# Patient Record
Sex: MALE | URBAN - NONMETROPOLITAN AREA
[De-identification: names, ages, dates, MRNs, and addresses within clinical notes are randomized per-mention and may not be internally consistent; named-entity substitution may affect disease eponyms.]

---

## 2018-12-27 NOTE — PATIENT DISCUSSION
Caused by Botox injection.  Advised heavy lubrication Gel QID. F/U one month. Some blur due to this as well.

## 2018-12-27 NOTE — PATIENT DISCUSSION
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 Heavy lubrication with gel QID. No or limited CL wear. Sleep with mask on. F/U one month.  Some of blur OD may be related to this.  Had symptoms prior to Botox of blur, but with signs today it is likely worse than typically seen.  F/U to compare and also see how the ERM affects [facility]. See below.

## 2019-11-01 ENCOUNTER — IMPORTED ENCOUNTER (OUTPATIENT)
Dept: URBAN - NONMETROPOLITAN AREA CLINIC 1 | Facility: CLINIC | Age: 59
End: 2019-11-01

## 2019-11-01 PROBLEM — H52.03: Noted: 2019-11-01

## 2019-11-01 PROBLEM — H52.4: Noted: 2019-11-01

## 2019-11-01 PROCEDURE — 92015 DETERMINE REFRACTIVE STATE: CPT

## 2019-11-01 PROCEDURE — 92004 COMPRE OPH EXAM NEW PT 1/>: CPT

## 2019-11-01 NOTE — PATIENT DISCUSSION
Simnple Hyperopia OU w/Presbyopia-  discussed findings w/patient-  s/p LASIK doing well-  new spectacle Rx issued-  monitor yearly or prn; 's Notes: MR 11/1/2019DFE 11/1/2019

## 2020-11-06 ENCOUNTER — IMPORTED ENCOUNTER (OUTPATIENT)
Dept: URBAN - NONMETROPOLITAN AREA CLINIC 1 | Facility: CLINIC | Age: 60
End: 2020-11-06

## 2020-11-06 PROCEDURE — 92014 COMPRE OPH EXAM EST PT 1/>: CPT

## 2020-11-06 PROCEDURE — 92015 DETERMINE REFRACTIVE STATE: CPT

## 2020-11-06 NOTE — PATIENT DISCUSSION
Simnple Hyperopia OU w/Presbyopia-  discussed findings w/patient-  s/p LASIK doing well-  patient defers Rx at this time-  he will continue w/readers for now-  continue to monitor yearly or prn; 's Notes: MR 11/6/2020DFE 11/6/2020

## 2022-04-09 ASSESSMENT — TONOMETRY
OS_IOP_MMHG: 15
OD_IOP_MMHG: 15
OD_IOP_MMHG: 13
OS_IOP_MMHG: 13

## 2022-04-09 ASSESSMENT — VISUAL ACUITY
OS_CC: 20/25 +2
OD_CC: 20/30 -2
OU_CC: J1+
OD_PH: 20/25 -1
OD_CC: 20/40+2
OS_CC: 20/30
OU_CC: 20/25 -1

## 2022-10-18 ENCOUNTER — COMPREHENSIVE EXAM (OUTPATIENT)
Dept: URBAN - NONMETROPOLITAN AREA CLINIC 4 | Facility: CLINIC | Age: 62
End: 2022-10-18

## 2022-10-18 DIAGNOSIS — H52.03: ICD-10-CM

## 2022-10-18 DIAGNOSIS — H52.4: ICD-10-CM

## 2022-10-18 PROCEDURE — 92014 COMPRE OPH EXAM EST PT 1/>: CPT

## 2022-10-18 PROCEDURE — 92015 DETERMINE REFRACTIVE STATE: CPT

## 2022-10-18 ASSESSMENT — VISUAL ACUITY
OD_PH: 20/25-2
OS_PH: 20/25
OD_SC: 20/50
OS_SC: 20/40-2

## 2022-10-18 ASSESSMENT — TONOMETRY
OD_IOP_MMHG: 18
OS_IOP_MMHG: 17

## 2024-01-22 ENCOUNTER — EMERGENCY VISIT (OUTPATIENT)
Dept: URBAN - NONMETROPOLITAN AREA CLINIC 4 | Facility: CLINIC | Age: 64
End: 2024-01-22

## 2024-01-22 DIAGNOSIS — H43.811: ICD-10-CM

## 2024-01-22 PROCEDURE — 99213 OFFICE O/P EST LOW 20 MIN: CPT

## 2024-01-22 PROCEDURE — 92134 CPTRZ OPH DX IMG PST SGM RTA: CPT

## 2024-01-22 ASSESSMENT — VISUAL ACUITY
OS_SC: 20/30-2
OD_PH: 20/25
OD_SC: 20/40

## 2024-01-22 ASSESSMENT — TONOMETRY
OS_IOP_MMHG: 16
OD_IOP_MMHG: 16

## 2024-02-06 ENCOUNTER — ESTABLISHED PATIENT (OUTPATIENT)
Dept: URBAN - NONMETROPOLITAN AREA CLINIC 4 | Facility: CLINIC | Age: 64
End: 2024-02-06

## 2024-02-06 DIAGNOSIS — H43.811: ICD-10-CM

## 2024-02-06 PROCEDURE — 99213 OFFICE O/P EST LOW 20 MIN: CPT

## 2024-02-06 ASSESSMENT — TONOMETRY
OS_IOP_MMHG: 14
OD_IOP_MMHG: 14

## 2024-02-06 ASSESSMENT — VISUAL ACUITY
OD_PH: 20/25+2
OD_SC: 20/50+1
OS_SC: 20/30